# Patient Record
Sex: FEMALE | Race: WHITE
[De-identification: names, ages, dates, MRNs, and addresses within clinical notes are randomized per-mention and may not be internally consistent; named-entity substitution may affect disease eponyms.]

---

## 2017-01-13 ENCOUNTER — HOSPITAL ENCOUNTER (EMERGENCY)
Dept: HOSPITAL 58 - ED | Age: 77
LOS: 1 days | Discharge: ANOTHER HOSPITAL | End: 2017-01-14
Payer: COMMERCIAL

## 2017-01-13 VITALS — SYSTOLIC BLOOD PRESSURE: 132 MMHG | DIASTOLIC BLOOD PRESSURE: 68 MMHG | TEMPERATURE: 97.9 F

## 2017-01-13 VITALS — BODY MASS INDEX: 35.4 KG/M2

## 2017-01-13 DIAGNOSIS — R53.1: ICD-10-CM

## 2017-01-13 DIAGNOSIS — I25.10: ICD-10-CM

## 2017-01-13 DIAGNOSIS — M54.9: ICD-10-CM

## 2017-01-13 DIAGNOSIS — R41.82: Primary | ICD-10-CM

## 2017-01-13 DIAGNOSIS — I10: ICD-10-CM

## 2017-01-13 LAB
ADD URINE MICROSCOPIC: YES
ALBUMIN SERPL-MCNC: 3.8 G/DL (ref 3.4–5)
ALBUMIN/GLOB SERPL: 0.84 {RATIO}
ALP SERPL-CCNC: 85 U/L (ref 53–141)
ALT SERPL-CCNC: 18 U/L (ref 12–78)
ANION GAP SERPL CALC-SCNC: 18.6 MMOL/L
AST SERPL-CCNC: 16 U/L (ref 15–37)
BACTERIA URNS QL MICRO: (no result)
BASOPHILS # BLD AUTO: 0.1 K/UL (ref 0–0.2)
BASOPHILS NFR BLD AUTO: 0.4 % (ref 0–3)
BILIRUB SERPL-MCNC: 0.5 MG/DL (ref 0–1.2)
BUN SERPL-MCNC: 37 MG/DL (ref 7–18)
BUN/CREAT SERPL: 25.34
CALCIUM SERPL-MCNC: 10.3 MG/DL (ref 8.2–10.2)
CHLORIDE SERPL-SCNC: 97 MMOL/L (ref 98–107)
CK SERPL-CCNC: 17 U/L
CO2 BLD-SCNC: 30 MMOL/L (ref 23–31)
CREAT SERPL-MCNC: 1.46 MG/DL (ref 0.6–1.3)
EOSINOPHIL # BLD AUTO: 0.1 K/UL (ref 0–0.7)
EOSINOPHIL NFR BLD AUTO: 0.6 % (ref 0–7)
GFR SERPLBLD BASED ON 1.73 SQ M-ARVRAT: 35 ML/MIN
GLOBULIN SER CALC-MCNC: 4.5 G/L
GLUCOSE SERPL-MCNC: 143 MG/DL (ref 82–115)
GRAN CASTS URNS QL MICRO: (no result)
HCT VFR BLD AUTO: 48.3 % (ref 37–47)
HGB BLD-MCNC: 15.2 G/DL (ref 12–16)
IMM GRANULOCYTES NFR BLD AUTO: 0.8 % (ref 0–5)
LYMPHOCYTES # SPEC AUTO: 1.9 K/UL (ref 0.6–3.4)
LYMPHOCYTES NFR BLD AUTO: 16.4 % (ref 10–50)
MCH RBC QN: 26.9 PG (ref 27–31)
MCHC RBC AUTO-ENTMCNC: 31.5 G/DL (ref 31.8–35.4)
MCV RBC: 85.3 FL (ref 81–99)
MONOCYTES # BLD AUTO: 0.7 K/UL (ref 0.4–2)
MONOCYTES NFR BLD AUTO: 5.5 % (ref 0–10)
NEUTROPHILS # BLD AUTO: 9 K/UL (ref 2–6.9)
NEUTROPHILS NFR BLD AUTO: 76.3 %
PH UR: 5 [PH] (ref 5–9)
PLATELET # BLD AUTO: 299 10^3/UL (ref 140–440)
POTASSIUM SERPL-SCNC: 4.6 MMOL/L (ref 3.5–5.1)
PROT ?TM UR QN: (no result) G
PROT SERPL-MCNC: 8.3 G/DL (ref 5.8–8.1)
RBC # BLD AUTO: 5.66 10^6/UL (ref 4.2–5.4)
SODIUM SERPL-SCNC: 141 MMOL/L (ref 136–145)
SP GR UR: 1.02 (ref 1–1.03)
TROPONIN I SERPL-MCNC: 0.04 NG/ML (ref 0–0.4)
WBC # BLD AUTO: 11.82 K/UL (ref 4.6–10.2)

## 2017-01-13 PROCEDURE — 36415 COLL VENOUS BLD VENIPUNCTURE: CPT

## 2017-01-13 PROCEDURE — 93010 ELECTROCARDIOGRAM REPORT: CPT

## 2017-01-13 PROCEDURE — 87086 URINE CULTURE/COLONY COUNT: CPT

## 2017-01-13 PROCEDURE — 80053 COMPREHEN METABOLIC PANEL: CPT

## 2017-01-13 PROCEDURE — 84484 ASSAY OF TROPONIN QUANT: CPT

## 2017-01-13 PROCEDURE — 99285 EMERGENCY DEPT VISIT HI MDM: CPT

## 2017-01-13 PROCEDURE — 81001 URINALYSIS AUTO W/SCOPE: CPT

## 2017-01-13 PROCEDURE — 96360 HYDRATION IV INFUSION INIT: CPT

## 2017-01-13 PROCEDURE — 85379 FIBRIN DEGRADATION QUANT: CPT

## 2017-01-13 PROCEDURE — 96361 HYDRATE IV INFUSION ADD-ON: CPT

## 2017-01-13 PROCEDURE — 85025 COMPLETE CBC W/AUTO DIFF WBC: CPT

## 2017-01-13 PROCEDURE — 93005 ELECTROCARDIOGRAM TRACING: CPT

## 2017-01-13 PROCEDURE — 82550 ASSAY OF CK (CPK): CPT

## 2017-01-13 PROCEDURE — 94640 AIRWAY INHALATION TREATMENT: CPT

## 2017-01-13 PROCEDURE — 96372 THER/PROPH/DIAG INJ SC/IM: CPT

## 2017-01-13 NOTE — CT
EXAM:  CT scan lumbar spine 

  

HISTORY:  Weakness 

  

COMPARISON:  None. 

  

FINDINGS:  Contiguous axial images were obtained through the lumbar spine utilizing 3-mm collimation
.  Sagittal and coronal reconstructions were imaged and reviewed..  There is mild dextroscoliosis.  
Multilevel degenerative disc disease throughout the lumbar spine.. 

  

Segmental analysis: 

  

T12-L1: 

  

Mild spondylitic bulge with facet arthropathy.  Central canal and foramen are patent. 

  

L1-L2: 

  

There is mild spondylitic bulge with mild facet arthropathy.  The central canal and foramen are hardy
nt. 

  

L2-L3: 

  

Mild disc bulge with facet arthropathy. The central canal and foramen are patent. 

  

L3-L4: 

  

There is mild spondylitic bulge with facet arthropathy which mildly narrows the left neural foramen 
and bilateral lateral recesses. 

  

L4-L5: 

  

There is concentric disc bulge with facet arthropathy.  There is bilateral neural foraminal narrowin
g left greater than right.  . 

  

L5- S1 levels incompletely visualized.  There are bilateral pars defects 

IMPRESSION: 

  

Multilevel degenerative disc disease 

  

AT L3-L4  there is left neural foraminal and bilateral lateral recess narrowing. 

  

L4-L5 there is bilateral neural foraminal narrowing. 

  

Incomplete imaging of L5 S1.  Bilateral pars defect with a pseudo disc bulge.

## 2017-01-13 NOTE — CT
EXAM:  CT of the head without contrast. 

  

HISTORY:  Weakness. 

  

PROCEDURE:  Contiguous axial CT images of the head without contrast. 

  

FINDINGS:  There is mild diffuse cerebral atrophy.  The ventricles and basal cisterns are normal in 
size and configuration.  No evidence of mass or midline shift.  No intracranial hemorrhage or eviden
ce of large vessel infarct.  No extra-axial fluid collection.  There are chronic small vessel ischem
ic changes in the white matter.  The paranasal sinuses and mastoid air cells are well-aerated. 

  

Impression:  No intracranial hemorrhage or evidence of large vessel infarct. 

  

Chronic small vessel ischemic changes. 

  

Diffuse cerebral atrophy.

## 2017-01-13 NOTE — ED.PDOC
General


ED Provider: 


Dr. SUNIL ANN





Chief Complaint: Altered Mental Status


Stated Complaint: Patient was at [employer], not been by herself. she had similar 

episode couple days ago, was seen @ [facility].  had heart cath last week.


Time Seen by Physician: 21:51


Information Source: Patient


Primary Care Provider: 


TUCKER KELLER





Nursing and Triage Documentation Reviewed and Agree: Yes





Neurological Complaint Exam





- Altered Mental Status Complaint/Exam


Current Mental Status: Confusion


Onset: Gradual


Symptoms Are: Still present


Timing: Constant


Episodes Lasting: Minutes


Initial Severity: Moderate


Current Severity: Moderate


Eye Deviation Present: No


Character: Reports: Confusion, Responsiveness, Lethargy


Aggravating: Reports: None


Alleviating: Reports: None


Associated Signs and Symptoms: Reports: Weakness.  Denies: Dizziness, Headache, 

Fever, Illness, Nuchal rigidity, Seizure, Nausea, Vomiting, Recently depressed, 

Trauma


Related History: Reports: Similar episode


Cardiac Risk Factors: Reports: Hypertension, CAD


CVA Risk Factors: Reports: Hypertension, CAD


Related Surgical History: Reports: None


Carotid Bruit Present: No


Nystagmus Present: No


Gag Reflex Present: No


Meningeal Signs Positive: No


Focal Weakness: Present: RLE, LLE


Gait: Unable


Finger-to-Nose: Abnormal right, Abnormal left


Heel to Toe Normal: No


Differential Diagnoses: Metabolic Disorder, Medication reaction, Sepsis, CVA





Review of Systems





- Review Of Systems


Constitutional: Reports: Malaise, Weakness


Eyes: Reports: No symptoms


Ears, Nose, Mouth, Throat: Reports: No symptoms


Respiratory: Reports: No symptoms


Cardiac: Reports: No symptoms


GI: Reports: No symptoms


: Reports: No symptoms


Musculoskeletal: Reports: No symptoms


Skin: Reports: No symptoms


Neurological: Reports: Weakness


Endocrine: Reports: No symptoms


Hematologic/Lymphatic: Reports: No symptoms


All Other Systems: Reviewed and Negative





Past Medical History





- Past Medical History


Previously Healthy: No


Endocrine: Reports: None


Cardiovascular: Reports: Hypertension


Respiratory: Reports: COPD


Hematological: Reports: None


Gastrointestinal: Reports: GERD


Genitourinary: Reports: None


Neuro/Psych: Reports: None, Other (bells palsy)


Musculoskeletal: Reports: Arthritis, Back Pain


Cancer: Reports: None





- Surgical History


General Surgical History: Reports: Hysterectomy, Tubal ligation, Tonsillectomy, 

Orthopedic (bilateral knee arthoplasty dr alvarez 2012), Other (heart cath 2012 

)





- Family History


Family History: Reports: None





Physical Exam





- Physical Exam


Appearance: Ill-appearing, Obese


Ill-appearing: Moderate


Eyes: EOMI, Conjunctiva clear


ENT: Ears normal, Nose normal, Oropharynx normal


Respiratory: Airway patent, Breath sounds clear, Breath sounds equal, 

Respirations nonlabored


Cardiovascular: RRR, Pulses normal, No rub, No murmur


GI/: Soft, Nontender, No masses, Bowel sounds normal, No Organomegaly


Musculoskeletal: Normal strength, ROM intact, No edema, No calf tenderness


Skin: Warm, Dry, Normal color


Neurological: Sensation intact, Disoriented, Focal Deficit (weak lower 

extremities)


Psychiatric: Affect appropriate, Mood appropriate





Interpretation





- Radiology Interpretation


Radiology Interpretation By: Radiologist


Radiology Results: Negative


Exam Interpreted: CT Scan





- EKG Interpretation


Time of EKG #1: 21:50


Rate: Normal


Rhythm: Sinus


Ectopy: None


Axis: Left


ST Segment: Other (LBBB)





Re-Evaluation





- Re-Evaluation


Time of Re-Evaluation: 00:08


Status: Unchanged





Critical Care Note





- Critical Care Note


Total Time (mins): 0





Course





- Course


Hematology/Chemistry: 


 01/13/17 21:54





 01/13/17 21:54


Orders, Labs, Meds: 


Lab Review











  01/13/17 01/13/17 01/14/17





  21:54 22:00 00:00


 


WBC  11.82 H  


 


RBC  5.66 H  


 


Hgb  15.2  


 


Hct  48.3 H  


 


MCV  85.3  


 


MCH  26.9 L  


 


MCHC  31.5 L  


 


RDW Coeff of Brandi  15.0 H  


 


Plt Count  299  


 


Immature Gran % (Auto)  0.8  


 


Neut % (Auto)  76.3  


 


Lymph % (Auto)  16.4  


 


Mono % (Auto)  5.5  


 


Eos % (Auto)  0.6  


 


Baso % (Auto)  0.4  


 


Immature Gran # (Auto)  0.1  


 


Neut #  9.0 H  


 


Lymph #  1.9  


 


Mono #  0.7  


 


Eos #  0.1  


 


Baso #  0.1  


 


D-Dimer    2.38


 


Sodium  141  


 


Potassium  4.6  


 


Chloride  97 L  


 


Carbon Dioxide  30  


 


Anion Gap  18.6  


 


BUN  37 H  


 


Creatinine  1.46 H  


 


Estimated GFR (MDRD)  35.00  


 


BUN/Creatinine Ratio  25.34  


 


Glucose  143 H  


 


Calcium  10.3 H  


 


Total Bilirubin  0.50  


 


AST  16  


 


ALT  18  


 


Alkaline Phosphatase  85  


 


Total Creatine Kinase  17  


 


Troponin I  0.0370  


 


Total Protein  8.3 H  


 


Albumin  3.8  


 


Globulin  4.5  


 


Albumin/Globulin Ratio  0.84  


 


Urine Color   Yellow 


 


Urine Clarity   Clear 


 


Urine pH   5.0 


 


Ur Specific Gravity   1.025 


 


Urine Protein   1+ 


 


Urine Glucose (UA)   Negative 


 


Urine Ketones   Trace 


 


Urine Blood   Negative 


 


Urine Nitrite   Negative 


 


Urine Bilirubin   Negative 


 


Urine Urobilinogen   0.2 


 


Ur Leukocyte Esterase   Negative 


 


Ur Squamous Epith Cells   10-20 


 


Amorphous Sediment   3+ 


 


Urine Bacteria   1+ 


 


Hyaline Casts   5-10 


 


Granular Casts   2-5 








Orders











 Category Date Time Status


 


 ABG DRAW REQUEST Stat CARDIO  01/14/17 00:49 Ordered


 


 ABG DRAW REQUEST Stat CARDIO  01/14/17 00:49 Ordered


 


 EKG-(ED ONLY) Stat CARDIO  01/13/17 21:49 Ordered


 


 NEBULIZER TREATMENT Stat CARDIO  01/14/17 00:49 Ordered


 


 O2 [ED APPLY O2] .ONCE EMERGENCY  01/14/17 00:50 Active


 


 CBC W/ AUTO DIFF Stat LAB  01/13/17 21:54 Completed


 


 COMPREHENSIVE METABOLIC PANEL Stat LAB  01/13/17 21:54 Completed


 


 CREATINE KINASE Stat LAB  01/13/17 21:54 Completed


 


 D-DIMER Stat LAB  01/14/17 00:00 Completed


 


 TROPONIN I Stat LAB  01/13/17 21:54 Completed


 


 URINALYSIS C & S IF INDICATED Stat LAB  01/13/17 22:00 Completed


 


 URINE CULTURE Routine LAB  01/13/17 22:24 Received


 


 Dexamethasone 4 mg/ml Inj [Decadron 4 mg/ml Sdv] MEDS  01/14/17 00:49 

Discontinued





 4 mg IM ONCE STA   


 


 Ipratropium/Albuterol Neb [Duoneb] MEDS  01/14/17 00:49 Discontinued





 1 vial NEB ONCE STA   


 


 CT HEAD W/O CONTRAST Stat RADS  01/13/17 21:49 Completed


 


 CT LUMBAR SPINE W/O CONTRAST Stat RADS  01/13/17 21:58 Completed








Medications














Discontinued Medications














Generic Name Dose Route Start Last Admin





  Trade Name Freq  PRN Reason Stop Dose Admin


 


Albuterol/Ipratropium  1 vial  01/14/17 00:49  





  Duoneb  NEB  01/14/17 00:50  





  ONCE STA   


 


Dexamethasone Sodium Phosphate  4 mg  01/14/17 00:49  





  Decadron 4 Mg/Ml Sdv  IM  01/14/17 00:50  





  ONCE STA   











Vital Signs: 


 











  Temp Pulse Resp BP Pulse Ox


 


 01/13/17 21:58  97.9 F  67  20  132/68  93 L














Departure





- Departure


Time of Disposition: 01:02


Disposition: TSF OTHER


Discharge Problem: 


 Altered mental status





Instructions:  Altered Mental Status (ED)


Condition: Stable


Pt referred to PMD for follow-up: Yes


Additional Instructions: 


discussed with Dr Bernal, admit to [facility]. direct admit and 

neurologist consult








Disposition Discussed With: Patient, Family

## 2017-01-14 ENCOUNTER — HOSPITAL ENCOUNTER (OUTPATIENT)
Dept: HOSPITAL 58 - AMBL | Age: 77
Discharge: HOME | End: 2017-01-14
Attending: INTERNAL MEDICINE
Payer: COMMERCIAL

## 2017-01-14 VITALS — BODY MASS INDEX: 35.4 KG/M2

## 2017-01-14 DIAGNOSIS — R47.81: Primary | ICD-10-CM

## 2017-01-14 DIAGNOSIS — R53.1: ICD-10-CM

## 2017-01-14 DIAGNOSIS — R29.810: ICD-10-CM

## 2017-05-10 ENCOUNTER — HOSPITAL ENCOUNTER (OUTPATIENT)
Dept: HOSPITAL 58 - NONPT | Age: 77
Discharge: HOME | End: 2017-05-10
Attending: FAMILY MEDICINE

## 2017-05-10 VITALS — BODY MASS INDEX: 35.4 KG/M2

## 2017-05-10 DIAGNOSIS — I12.9: ICD-10-CM

## 2017-05-10 DIAGNOSIS — F32.9: ICD-10-CM

## 2017-05-10 DIAGNOSIS — N18.9: Primary | ICD-10-CM

## 2017-05-10 DIAGNOSIS — I69.322: ICD-10-CM

## 2017-05-10 LAB
ANION GAP SERPL CALC-SCNC: 15.3 MMOL/L
BUN SERPL-MCNC: 26 MG/DL (ref 7–18)
BUN/CREAT SERPL: 26.8
CALCIUM SERPL-MCNC: 9.5 MG/DL (ref 8.2–10.2)
CHLORIDE SERPL-SCNC: 103 MMOL/L (ref 98–107)
CO2 BLD-SCNC: 26 MMOL/L (ref 23–31)
CREAT SERPL-MCNC: 0.97 MG/DL (ref 0.6–1.3)
GFR SERPLBLD BASED ON 1.73 SQ M-ARVRAT: 55 ML/MIN
GLUCOSE SERPL-MCNC: 128 MG/DL (ref 82–115)
POTASSIUM SERPL-SCNC: 4.3 MMOL/L (ref 3.5–5.1)
SODIUM SERPL-SCNC: 140 MMOL/L (ref 136–145)

## 2017-05-10 PROCEDURE — 80048 BASIC METABOLIC PNL TOTAL CA: CPT

## 2018-10-29 ENCOUNTER — HOSPITAL ENCOUNTER (OUTPATIENT)
Dept: HOSPITAL 58 - LAB | Age: 78
Discharge: HOME | End: 2018-10-29
Attending: FAMILY MEDICINE
Payer: COMMERCIAL

## 2018-10-29 VITALS — BODY MASS INDEX: 35.4 KG/M2

## 2018-10-29 DIAGNOSIS — I10: Primary | ICD-10-CM

## 2018-10-29 PROCEDURE — 80053 COMPREHEN METABOLIC PANEL: CPT

## 2018-10-29 PROCEDURE — 84443 ASSAY THYROID STIM HORMONE: CPT

## 2018-10-29 PROCEDURE — 80061 LIPID PANEL: CPT

## 2018-10-29 PROCEDURE — 36415 COLL VENOUS BLD VENIPUNCTURE: CPT

## 2018-10-29 PROCEDURE — 81001 URINALYSIS AUTO W/SCOPE: CPT

## 2018-10-29 PROCEDURE — 85025 COMPLETE CBC W/AUTO DIFF WBC: CPT
